# Patient Record
Sex: FEMALE | Race: WHITE | NOT HISPANIC OR LATINO | Employment: FULL TIME | URBAN - METROPOLITAN AREA
[De-identification: names, ages, dates, MRNs, and addresses within clinical notes are randomized per-mention and may not be internally consistent; named-entity substitution may affect disease eponyms.]

---

## 2021-06-04 ENCOUNTER — HOSPITAL PATHOLOGY (OUTPATIENT)
Dept: OTHER | Facility: CLINIC | Age: 46
End: 2021-06-04

## 2021-06-09 LAB — COPATH REPORT: NORMAL

## 2021-07-04 ENCOUNTER — HEALTH MAINTENANCE LETTER (OUTPATIENT)
Age: 46
End: 2021-07-04

## 2021-10-24 ENCOUNTER — HEALTH MAINTENANCE LETTER (OUTPATIENT)
Age: 46
End: 2021-10-24

## 2022-07-23 ENCOUNTER — HOSPITAL ENCOUNTER (EMERGENCY)
Facility: CLINIC | Age: 47
Discharge: HOME | End: 2022-07-23
Attending: EMERGENCY MEDICINE | Admitting: EMERGENCY MEDICINE
Payer: COMMERCIAL

## 2022-07-23 ENCOUNTER — APPOINTMENT (OUTPATIENT)
Dept: GENERAL RADIOLOGY | Facility: CLINIC | Age: 47
End: 2022-07-23
Attending: EMERGENCY MEDICINE
Payer: COMMERCIAL

## 2022-07-23 VITALS
RESPIRATION RATE: 14 BRPM | DIASTOLIC BLOOD PRESSURE: 85 MMHG | OXYGEN SATURATION: 97 % | HEART RATE: 90 BPM | TEMPERATURE: 98.2 F | SYSTOLIC BLOOD PRESSURE: 140 MMHG
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
## 2022-07-23 DIAGNOSIS — T18.128A FOOD IMPACTION OF ESOPHAGUS, INITIAL ENCOUNTER: ICD-10-CM

## 2022-07-23 PROCEDURE — 99283 EMERGENCY DEPT VISIT LOW MDM: CPT

## 2022-07-23 PROCEDURE — 70360 X-RAY EXAM OF NECK: CPT

## 2022-07-23 ASSESSMENT — ENCOUNTER SYMPTOMS
SORE THROAT: 1
TROUBLE SWALLOWING: 0
ABDOMINAL PAIN: 0

## 2022-07-23 NOTE — ED PROVIDER NOTES
History   Chief Complaint:  Swallowed Foreign Body     The history is provided by the patient.      Tere Zamora is a 47 year old female with history of dyslipidemia, chronic insomnia, anxiety, hypothyroidism, Grave's disease, anemia, TMJ disorder, and vitamin B6 deficiency who presents with swallowed foreign body. She reports that about 2 hours she swallowed a piece of oregano and feels it shifting and jabbing her throat. She reports that she read an article and tried carbonated water, fake butter, baking soda, and bread to dislodge the oregano with no relief. She states she called the nurse line and was directed to the ED. She reports that at this time her sore throat is feeling better. At this time, she reports no trouble swallowing, abdominal pain, or chest pain.     Review of Systems   HENT: Positive for sore throat. Negative for trouble swallowing.    Cardiovascular: Negative for chest pain.   Gastrointestinal: Negative for abdominal pain.   All other systems reviewed and are negative.    Allergies:  Compazine  Phenothiazines  Nortriptyline  Montelukast    Medications:  Xanax  Hydrocodone  Ultram  Levoxyl  Junel  Sertraline  Wellbutrin  Revia  Desyrel    Past Medical History:     Axis II diagnosis  Psychosexual disorder  Adjustment disorder with mixed anxiety and depressed mood  Post concussion syndrome  Vocal cord dysfunction  Abnormal pap smear  Overweight  Dyslipidemia  Chronic insomnia  Anxiety  Hypothyroidism  Grave's disease   Amblyopia  Strabismus  Anemia  TMJ disorder  Vitamin B6 deficiency    Past Surgical History:      D&C  [place] teeth extraction  Colposcopy    Family History:    Father: anxiety, depression, diabetes, heart disease, hypertension, LILLIANA, retinal detachment  Mother: blindness, high cholesterol, hypertension, obesity, retinal detachment  Sister: breast cancer    Social History:  The patient presents to the ED alone  PCP: Grant Smalls MD    Physical Exam     Patient  Vitals for the past 24 hrs:   BP Temp Temp src Pulse Resp SpO2   07/23/22 1546 (!) 140/85 98.2  F (36.8  C) Temporal 90 14 97 %       Physical Exam  General: Well appearing, pleasant female, resting on exam bed  HEENT: No evidence of trauma.  Conjunctive are clear. Neck range of motion intact.  Nose and throat clear. No stridor or tripoding.  Respiratory: Good effort  Cardiovascular: Good distal perfusion  Gastrointestinal: Nondistended  Musculoskeletal: Atraumatic  Skin: Exposed skin clear.  Neurologic: Alert.  Psych:  Patient is cooperative, with normal affect.    Emergency Department Course     Imaging:  Neck soft tissue XR   Final Result   IMPRESSION: No radiopaque foreign body identified. The hypopharynx, larynx and visualized trachea are well-aerated.        Report per radiology    Emergency Department Course:     Reviewed:  I reviewed nursing notes, vitals, past medical history and Care Everywhere    Assessments:  1630 I obtained history and examined the patient as noted above.     Disposition:  The patient was discharged to home.     Impression & Plan     Medical Decision Making:  Tere Zamora is a 47 year old female presents emergency room today for evaluation of with pain with swallowing secondary to a oregano stem from a salad earlier today.  See HPI.  Her vitals are unremarkable.  She feels like there is something in her throat.  Radiographs were completed in triage that revealed no acute abnormalities of her neck.  She has a reassuring exam.  She feels better at this time.  She is tolerating p.o.  No indication for emergent consultation.  Unlikely esophageal rupture.  Her symptoms are not completely gone so if she still have significant symptoms tomorrow, she can call ENT or GI for further evaluation.  I do not feel further work-up is indicated at this time.  She is to return with new or worsening symptoms.  No further questions and agrees with plan.    Diagnosis:    ICD-10-CM    1. Food impaction of  esophagus, initial encounter  T18.128A Adult ENT  Referral     Adult GI  Referral - Procedure Only     Scribe Disclosure:  I, Tiffanie Lopez, am serving as a scribe at 4:30 PM on 7/23/2022 to document services personally performed by Javon Simon PA-C based on my observations and the provider's statements to me.          Javon Simon PA-C  07/23/22 1903

## 2022-07-31 ENCOUNTER — HEALTH MAINTENANCE LETTER (OUTPATIENT)
Age: 47
End: 2022-07-31

## 2022-10-15 ENCOUNTER — HEALTH MAINTENANCE LETTER (OUTPATIENT)
Age: 47
End: 2022-10-15

## 2023-08-20 ENCOUNTER — HEALTH MAINTENANCE LETTER (OUTPATIENT)
Age: 48
End: 2023-08-20

## 2024-10-13 ENCOUNTER — HEALTH MAINTENANCE LETTER (OUTPATIENT)
Age: 49
End: 2024-10-13